# Patient Record
Sex: FEMALE | Race: WHITE | URBAN - METROPOLITAN AREA
[De-identification: names, ages, dates, MRNs, and addresses within clinical notes are randomized per-mention and may not be internally consistent; named-entity substitution may affect disease eponyms.]

---

## 2023-12-17 ENCOUNTER — CLAIMS CREATED FROM THE CLAIM WINDOW (OUTPATIENT)
Dept: URBAN - METROPOLITAN AREA MEDICAL CENTER 27 | Facility: MEDICAL CENTER | Age: 48
End: 2023-12-17
Payer: COMMERCIAL

## 2023-12-17 DIAGNOSIS — Z87.11 H/O GASTRIC ULCER: ICD-10-CM

## 2023-12-17 DIAGNOSIS — R55 ATYPICAL SYNCOPE: ICD-10-CM

## 2023-12-17 DIAGNOSIS — K92.1 MELENA: ICD-10-CM

## 2023-12-17 DIAGNOSIS — R71.0 DECREASED HEMOGLOBIN: ICD-10-CM

## 2023-12-17 PROCEDURE — 99254 IP/OBS CNSLTJ NEW/EST MOD 60: CPT | Performed by: INTERNAL MEDICINE

## 2023-12-17 PROCEDURE — 99222 1ST HOSP IP/OBS MODERATE 55: CPT | Performed by: INTERNAL MEDICINE

## 2023-12-17 PROCEDURE — G8427 DOCREV CUR MEDS BY ELIG CLIN: HCPCS | Performed by: INTERNAL MEDICINE

## 2023-12-18 ENCOUNTER — CLAIMS CREATED FROM THE CLAIM WINDOW (OUTPATIENT)
Dept: URBAN - METROPOLITAN AREA MEDICAL CENTER 27 | Facility: MEDICAL CENTER | Age: 48
End: 2023-12-18

## 2023-12-18 ENCOUNTER — CLAIMS CREATED FROM THE CLAIM WINDOW (OUTPATIENT)
Dept: URBAN - METROPOLITAN AREA MEDICAL CENTER 27 | Facility: MEDICAL CENTER | Age: 48
End: 2023-12-18
Payer: COMMERCIAL

## 2023-12-18 DIAGNOSIS — K92.2 ACUTE GASTROINTESTINAL BLEEDING: ICD-10-CM

## 2023-12-18 DIAGNOSIS — K91.89 AFFERENT LOOP SYNDROME: ICD-10-CM

## 2023-12-18 PROCEDURE — 43235 EGD DIAGNOSTIC BRUSH WASH: CPT | Performed by: INTERNAL MEDICINE

## 2023-12-18 PROCEDURE — 43255 EGD CONTROL BLEEDING ANY: CPT | Performed by: INTERNAL MEDICINE

## 2023-12-19 ENCOUNTER — CLAIMS CREATED FROM THE CLAIM WINDOW (OUTPATIENT)
Dept: URBAN - METROPOLITAN AREA MEDICAL CENTER 27 | Facility: MEDICAL CENTER | Age: 48
End: 2023-12-19

## 2023-12-19 ENCOUNTER — CLAIMS CREATED FROM THE CLAIM WINDOW (OUTPATIENT)
Dept: URBAN - METROPOLITAN AREA MEDICAL CENTER 27 | Facility: MEDICAL CENTER | Age: 48
End: 2023-12-19
Payer: COMMERCIAL

## 2023-12-19 DIAGNOSIS — Z87.11 H/O GASTRIC ULCER: ICD-10-CM

## 2023-12-19 DIAGNOSIS — D62 ABLA (ACUTE BLOOD LOSS ANEMIA): ICD-10-CM

## 2023-12-19 DIAGNOSIS — K92.1 ACUTE MELENA: ICD-10-CM

## 2023-12-19 PROCEDURE — 99232 SBSQ HOSP IP/OBS MODERATE 35: CPT | Performed by: PHYSICIAN ASSISTANT

## 2023-12-19 PROCEDURE — 99232 SBSQ HOSP IP/OBS MODERATE 35: CPT | Performed by: INTERNAL MEDICINE

## 2023-12-20 ENCOUNTER — CLAIMS CREATED FROM THE CLAIM WINDOW (OUTPATIENT)
Dept: URBAN - METROPOLITAN AREA MEDICAL CENTER 27 | Facility: MEDICAL CENTER | Age: 48
End: 2023-12-20
Payer: COMMERCIAL

## 2023-12-20 ENCOUNTER — CLAIMS CREATED FROM THE CLAIM WINDOW (OUTPATIENT)
Dept: URBAN - METROPOLITAN AREA MEDICAL CENTER 27 | Facility: MEDICAL CENTER | Age: 48
End: 2023-12-20

## 2023-12-20 DIAGNOSIS — D62 ABLA (ACUTE BLOOD LOSS ANEMIA): ICD-10-CM

## 2023-12-20 DIAGNOSIS — K92.1 ACUTE MELENA: ICD-10-CM

## 2023-12-20 DIAGNOSIS — Z87.11 H/O GASTRIC ULCER: ICD-10-CM

## 2023-12-20 PROCEDURE — 99231 SBSQ HOSP IP/OBS SF/LOW 25: CPT | Performed by: PHYSICIAN ASSISTANT

## 2023-12-20 PROCEDURE — 99231 SBSQ HOSP IP/OBS SF/LOW 25: CPT | Performed by: INTERNAL MEDICINE

## 2024-07-29 ENCOUNTER — DASHBOARD ENCOUNTERS (OUTPATIENT)
Age: 49
End: 2024-07-29

## 2024-07-29 ENCOUNTER — OFFICE VISIT (OUTPATIENT)
Dept: URBAN - METROPOLITAN AREA CLINIC 78 | Facility: CLINIC | Age: 49
End: 2024-07-29
Payer: COMMERCIAL

## 2024-07-29 VITALS
HEIGHT: 68 IN | TEMPERATURE: 98.1 F | RESPIRATION RATE: 14 BRPM | HEART RATE: 75 BPM | SYSTOLIC BLOOD PRESSURE: 136 MMHG | DIASTOLIC BLOOD PRESSURE: 81 MMHG

## 2024-07-29 DIAGNOSIS — Z12.11 COLON CANCER SCREENING: ICD-10-CM

## 2024-07-29 DIAGNOSIS — Z86.2: ICD-10-CM

## 2024-07-29 PROBLEM — 275538002: Status: ACTIVE | Noted: 2024-07-29

## 2024-07-29 PROCEDURE — 99204 OFFICE O/P NEW MOD 45 MIN: CPT | Performed by: INTERNAL MEDICINE

## 2024-07-29 NOTE — PHYSICAL EXAM GASTROINTESTINAL
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

## 2024-07-29 NOTE — HPI-TODAY'S VISIT:
Patient was referred by Dr. Livia Quispe  A copy of this document will be sent to the physician. Patient is a 49-year-old pleasant female seen primarily for screening colonoscopy.  She has history of anemia and reports for GI bleed incidents in her lifetime the fourth incident was in December 2023.  Because of the stressors and insomnia she was partaking NSAIDs and sleeping pills.  She has  Charlene-en-Y gastrojejunostomy.  During the examination she was noted to have an anastomotic site ulceration which was injected and use of bipolar probe successfully had a hemoglobin of 8.8 patient reports at the time of discharge her hemoglobin was 12 point something.  Since that December) she has not had any lab work done she does she is currently not taking any NSAIDs  She has hx of chronic anemia , reportedly baseline is at ten  Denies acid reducers no but did take acid reducers for 2-3 months post d/c   The patient presents for a colon cancer screening. There is no family history of colon polyps or cancer. Patient denies change in bowel habits, appetite, and weight. Patient denies bleeding per rectum. Last colonoscopy:,Never   Personal histor of major chidhood surgery   Deneis chest pain  Deneis SOB  Denies easy brusing  Denies anesthesia complication   Cardiac risk : No pending cardiac work up  Denies weight loss meds

## 2024-07-30 LAB
BASO (ABSOLUTE): 0
BASOS: 1
EOS (ABSOLUTE): 0.1
EOS: 1
FERRITIN, SERUM: 55
HEMATOCRIT: 49.9
HEMATOLOGY COMMENTS:: (no result)
HEMOGLOBIN: 16.3
IMMATURE CELLS: (no result)
IMMATURE GRANS (ABS): 0
IMMATURE GRANULOCYTES: 0
LYMPHS (ABSOLUTE): 1.6
LYMPHS: 20
MCH: 30.6
MCHC: 32.7
MCV: 94
MONOCYTES(ABSOLUTE): 0.6
MONOCYTES: 7
NEUTROPHILS (ABSOLUTE): 5.4
NEUTROPHILS: 71
NRBC: (no result)
PLATELETS: 301
RBC: 5.33
RDW: 12
WBC: 7.7

## 2024-08-15 ENCOUNTER — CLAIMS CREATED FROM THE CLAIM WINDOW (OUTPATIENT)
Dept: URBAN - METROPOLITAN AREA SURGERY CENTER 15 | Facility: SURGERY CENTER | Age: 49
End: 2024-08-15
Payer: COMMERCIAL

## 2024-08-15 DIAGNOSIS — Z12.11 COLON CANCER SCREENING: ICD-10-CM

## 2024-08-15 DIAGNOSIS — K64.9 HEMORRHOIDS: ICD-10-CM

## 2024-08-15 DIAGNOSIS — K57.30 DIVERTICULOSIS OF COLON: ICD-10-CM

## 2024-08-15 PROCEDURE — G0121 COLON CA SCRN NOT HI RSK IND: HCPCS | Performed by: INTERNAL MEDICINE

## 2024-08-15 PROCEDURE — 0528F RCMND FLW-UP 10 YRS DOCD: CPT | Performed by: INTERNAL MEDICINE

## 2024-08-15 PROCEDURE — 00812 ANES LWR INTST SCR COLSC: CPT | Performed by: NURSE ANESTHETIST, CERTIFIED REGISTERED

## 2025-01-14 ENCOUNTER — OFFICE VISIT (OUTPATIENT)
Dept: URBAN - METROPOLITAN AREA CLINIC 12 | Facility: CLINIC | Age: 50
End: 2025-01-14
Payer: COMMERCIAL

## 2025-01-14 VITALS
SYSTOLIC BLOOD PRESSURE: 126 MMHG | TEMPERATURE: 98.1 F | HEART RATE: 76 BPM | DIASTOLIC BLOOD PRESSURE: 80 MMHG | HEIGHT: 68 IN | WEIGHT: 182 LBS | RESPIRATION RATE: 76 BRPM | BODY MASS INDEX: 27.58 KG/M2

## 2025-01-14 DIAGNOSIS — K25.4 CHRONIC GASTRIC ULCER WITH HEMORRHAGE: ICD-10-CM

## 2025-01-14 DIAGNOSIS — Z86.2 HISTORY OF ANEMIA: ICD-10-CM

## 2025-01-14 PROBLEM — 57246001: Status: ACTIVE | Noted: 2025-01-14

## 2025-01-14 PROCEDURE — 99214 OFFICE O/P EST MOD 30 MIN: CPT | Performed by: INTERNAL MEDICINE

## 2025-01-14 RX ORDER — MULTIVIT-MIN/IRON/FOLIC ACID/K 18-600-40
AS DIRECTED CAPSULE ORAL
Status: ACTIVE | COMMUNITY

## 2025-01-14 RX ORDER — OMEPRAZOLE 40 MG/1
1 CAPSULE 30 MINUTES BEFORE MORNING MEAL CAPSULE, DELAYED RELEASE ORAL ONCE A DAY
Qty: 90 | Refills: 3 | OUTPATIENT
Start: 2025-01-14

## 2025-01-14 RX ORDER — FERROUS SULFATE 325(65) MG
1 TABLET TABLET ORAL
Status: ACTIVE | COMMUNITY

## 2025-01-14 NOTE — HPI-TODAY'S VISIT:
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  h/o GJ anastomosis ulceration with bleeding  [facility] hosp stay for GI bleed from GJ anastomosis ulcer s/p cautery and clips in 12/2023 . [id number]--dwns, hemorrhoids. repeat in 2034 EGD 12/2023--stephanie-N-Y gastrojejunostomy with GJ anastomosis ulceration with bleeding s/p cautery and clips  . h/o heavy work stress says she has had multiple hosp admissions for GI bleed from  takes excedrin once a month for headache around her menses otherwise takes tylenol pm to sleep no overt GI bleeding not on PPI no N/V/abd pain BM everyday

## 2025-02-11 ENCOUNTER — WEB ENCOUNTER (OUTPATIENT)
Dept: URBAN - METROPOLITAN AREA CLINIC 12 | Facility: CLINIC | Age: 50
End: 2025-02-11

## 2025-02-11 RX ORDER — OMEPRAZOLE 20 MG/1
1 CAPSULE 1/2 TO 1 HOUR BEFORE MORNING MEAL CAPSULE, DELAYED RELEASE ORAL ONCE A DAY
Qty: 90 | Refills: 3 | OUTPATIENT
Start: 2025-02-12

## 2025-02-12 ENCOUNTER — WEB ENCOUNTER (OUTPATIENT)
Dept: URBAN - METROPOLITAN AREA CLINIC 12 | Facility: CLINIC | Age: 50
End: 2025-02-12

## 2025-03-18 ENCOUNTER — OFFICE VISIT (OUTPATIENT)
Dept: URBAN - METROPOLITAN AREA CLINIC 12 | Facility: CLINIC | Age: 50
End: 2025-03-18

## 2025-08-14 ENCOUNTER — WEB ENCOUNTER (OUTPATIENT)
Dept: URBAN - METROPOLITAN AREA CLINIC 12 | Facility: CLINIC | Age: 50
End: 2025-08-14